# Patient Record
Sex: FEMALE | Race: OTHER | Employment: UNEMPLOYED | URBAN - METROPOLITAN AREA
[De-identification: names, ages, dates, MRNs, and addresses within clinical notes are randomized per-mention and may not be internally consistent; named-entity substitution may affect disease eponyms.]

---

## 2020-03-08 ENCOUNTER — HOSPITAL ENCOUNTER (OUTPATIENT)
Age: 35
Discharge: HOME | End: 2020-03-08
Attending: FAMILY MEDICINE
Payer: COMMERCIAL

## 2020-03-08 VITALS
RESPIRATION RATE: 18 BRPM | SYSTOLIC BLOOD PRESSURE: 149 MMHG | OXYGEN SATURATION: 100 % | DIASTOLIC BLOOD PRESSURE: 79 MMHG | TEMPERATURE: 99 F | HEART RATE: 72 BPM

## 2020-03-08 DIAGNOSIS — G44.009 CLUSTER HEADACHE, NOT INTRACTABLE, UNSPECIFIED CHRONICITY PATTERN: Primary | ICD-10-CM

## 2020-03-08 PROCEDURE — 99214 OFFICE O/P EST MOD 30 MIN: CPT

## 2020-03-08 PROCEDURE — 99213 OFFICE O/P EST LOW 20 MIN: CPT

## 2020-03-08 RX ORDER — BUTALBITAL, ACETAMINOPHEN AND CAFFEINE 50; 325; 40 MG/1; MG/1; MG/1
1-2 TABLET ORAL EVERY 6 HOURS PRN
Qty: 10 TABLET | Refills: 0 | Status: SHIPPED | OUTPATIENT
Start: 2020-03-08 | End: 2020-03-15

## 2020-03-08 NOTE — ED PROVIDER NOTES
Patient presents with:  Headache      HPI:     Em Bowden is a 27year old female who presents with for chief complaint of headache and both eye redness   X 2 weeks  Intermittent   Pain ranges from 3-7/10   No blurry vision, no vomiting, not worst hea deviation. No adenopathy present. No thyromegaly present. 3. Cardiovascular: Normal rate, regular rhythm and intact distal pulses. Exam reveals no gallop and no friction rub. No murmur heard.   4.RESPIRATORY/Pulmonary/Chest: Effort normal and breath s

## 2020-03-08 NOTE — ED INITIAL ASSESSMENT (HPI)
Headaches for 2 weeks with eye pain and redness. denies any cough cold runny nose. stts pain inproves with motrin but returns in temple area. Speech clear mmm a/o x3.